# Patient Record
(demographics unavailable — no encounter records)

---

## 2024-11-04 NOTE — HISTORY OF PRESENT ILLNESS
[FreeTextEntry1] : transient global amnesia 3 years ago, barrets, pancreatic lesion being following, breast ca 5.5 years ago lumpectomy, radiation tx  essential thrombocytosis   sudden lightheadedness, walking, on exertion  never syncope   heart enxymes elevated at the time   every 4-5 months break out in swet    surgery lumpectomy. 2 c section  family hx no heart disease  social hx retired.   exercise - walking daily, now not as often since dog passed  pregnancies 2 pregnancies, 3 children (twin), borderline pre eclampsia with twin pregnancy

## 2024-11-04 NOTE — ASSESSMENT
[FreeTextEntry1] : Dizziness   HLD  80 atorva   HTN  unclear why on metoprolol 50    echo  monitor 2 week  f/u in 1 month

## 2024-12-17 NOTE — ASSESSMENT
[FreeTextEntry1] : Dizziness   echo, 2 week monitor    HLD  cont atorvastatin 80mg   HTN  on metoprolol,     f/u in 1 month

## 2024-12-17 NOTE — HISTORY OF PRESENT ILLNESS
[FreeTextEntry1] : 71 yo female with a hx transient global amnesia 3 years ago, Barretts esophagitis, pancreatic lesion, breast ca 5.5 years ago s/p lumpectomy and radiation tx, essential thrombocytosis referred for dizziness.   Reports episodic lightheadedness/near syncope occurring every 4-5 months. Associated with diaphoresis, on exertion.   Denies chest pain, SOB, palpitations.    PSHx: lumpectomy. 2 c section  Family hx no heart disease  Social hx retired.   Exercise - walking daily, now not as often since dog passed  Ob hx: 2 pregnancies, 3 children (twin), borderline pre-eclampsia with twin pregnancy

## 2025-01-10 NOTE — ASSESSMENT
[FreeTextEntry1] : 1.left periorbital hematoma/abrasion: Healing at this time with no neurological complaints or symptoms.  Will defer imaging at this time.  Advised to continue to ice area and take OTC Tylenol as needed for pain relief.  Discussed safety measures while sleeping.  2.vivid dreams: With recent fall.  Discussed safety measures to address around her bed and patient is agreeable to do so.  Agree with assessment with sleep medicine and patient will follow-up with Dr.Jalil Cuevas. All questions answered.  f/u with PCP.

## 2025-01-10 NOTE — PHYSICAL EXAM
[TextEntry] : General: NAD HEENT: NC/AT, EOMI, PERRLA. hematoma/bruise of left periorbital area with no TTP. Neck: supple, no JVD. no LAD. Neuro: aaox3, 5/5 motor strength in all 4 extremities. normal sensation, normal gait.

## 2025-01-10 NOTE — HISTORY OF PRESENT ILLNESS
[FreeTextEntry8] : Ms. GERA HAMLIN is a 70 year F who comes in for an acute visit. Pt notes very vivid dreams/nightmares for the last 2-3 years and fell out of bed 3 years ago with minor injury. Recently last week had bad dream and rolled out of bed and hit nightstand with left side of head near eye and had large bruise around eye and small cut on arm.  Patient states she awoke on the floor and had no other injuries that she was aware of.  She notes pain to the area of the left eye and head and has iced the area and taking OTC pain medication.  She denies any headache, blurry vision, loss of vision, loss of power or strength in any extremities or any numbness tingling.

## 2025-02-25 NOTE — HISTORY OF PRESENT ILLNESS
[FreeTextEntry1] : Pt is here for follow up of multiple medical problems including  OP,  CAD,  hyeplipidemia,htn

## 2025-07-14 NOTE — HISTORY OF PRESENT ILLNESS
[FreeTextEntry1] :   PATIENT WAS NOTIFIED THAT ANYTHING WE DISCUSSED IN OUR MEETING TODAY MAY BE REFLECTED IN WRITING IN THE VISIT NOTE WHICH WILL BE AVAILABLE TO OTHER MEDICAL PROVIDERS TO REVIEW AS PART OF ROUTINE PATIENT CARE.  PATIENT VERBALLY AGREED.   71 year old female who presents for evaluation and treatment of obesity. HLD, "sugar high", many meds, referred by pcp for WL   Bariatric surgery history: no  Obesity co-morbidities: preDM, HLD, HTN, GERD- PPI, MOD JUWAN- on cpap, TGA Anti-obesity medications: no  Obesity medication side effects: PMH/PSH hiatal hernia s/p surgery, HH returned, mckinney's esophus, depression on prozac, breast ca s/p lumpectomy FH no thyroid ca SH no smoking, no etoh      SOCIAL/STRESSORS: Patient lives - w/ , occ 3 dtrs visit  Employment status - retired from [employer]    WEIGHT HISTORY: Lowest adult weight: 105 (early 30s, depressed) Highest adult weight: current  Current weight: 145 Height: 5'   Has gained/lost over the past year? GAINED 10 lbs since 1 yr ago   Weight gain has occurred with: 1st pregnancy, had hiatal hernia   Past weight loss attempts include: WW, worked w/RD (lost 20lbs, but gained back)     SLEEP: sleeps/wakes: 1030/0730 Uninterrupted? yes,  +MOD JUWAN- uses cpap   PHYSICAL ACTIVITY: Current physical activity: walks 1 mile/day on TM and plays tennis few times/week      FOOD: B: 0800, OM w/ egg in it, coffee w/ almond milk L: 12, out to eat - BLT at nearby [employer], quesidilla (cheese), salad w/ ranch dressing and fried chicken D: 6p, chicken (w/ flour), kalyn, broccoli- sauteed, baked potato w/ butter or white rice, EO- sushi roll,    snacks: afternoon, nuts, cookies, grazer eating after dinner: no overeating episodes: no   Sodas/fast food/Take Out/Eat Out: EO for most L and D, occ soda- gingerale, no FF   Water intake per day: very little     Intake form reviewed.

## 2025-07-14 NOTE — ASSESSMENT
[FreeTextEntry1] : Recommendations:   -discussed Nutrition focus of WF/PB for optimal calorie reduction -discussed avoidance of oils/fats and sugars/artificial sweeteners -avoid processed/refined carbs -minimize animal products -have 3 distinct meals, eat to fullness, avoid snacking in between or after dinner -majority of calories to be earlier in the day -goal water intake - 64 oz per day- needs to increase, 1 glass/meal -goal physical activity-  moderate activity 30mins 5x/week and add THOMAS -adequate sleep (7+ hours, uninterrupted) was emphasized= MOD JUWAN, uses cpap - metabolic labs JULY, HA1c 5.7 - prescribe: metformin 1000mg, esc to 1500, GI s/e d/w her  - would not rec GLP-1 for her, BMI 28 and has alireza's esophagus and hiatal hernia- meds may exacerbate.  - nutrition resources - can d/w her PCP/cardio for changing BB as it does cause wt gain.    Return to [facility] in 4-6 weeks .